# Patient Record
Sex: MALE | Race: WHITE | NOT HISPANIC OR LATINO | URBAN - METROPOLITAN AREA
[De-identification: names, ages, dates, MRNs, and addresses within clinical notes are randomized per-mention and may not be internally consistent; named-entity substitution may affect disease eponyms.]

---

## 2023-09-21 ENCOUNTER — TRANSCRIBE ORDERS (OUTPATIENT)
Dept: ADMINISTRATIVE | Facility: HOSPITAL | Age: 43
End: 2023-09-21

## 2023-09-21 DIAGNOSIS — M75.121 COMPLETE TEAR OF RIGHT ROTATOR CUFF, UNSPECIFIED WHETHER TRAUMATIC: Primary | ICD-10-CM

## 2023-10-12 ENCOUNTER — HOSPITAL ENCOUNTER (OUTPATIENT)
Dept: MRI IMAGING | Facility: HOSPITAL | Age: 43
Discharge: HOME | End: 2023-10-12
Admitting: FAMILY MEDICINE
Payer: COMMERCIAL

## 2023-10-12 DIAGNOSIS — M75.121 COMPLETE TEAR OF RIGHT ROTATOR CUFF, UNSPECIFIED WHETHER TRAUMATIC: ICD-10-CM

## 2023-10-12 PROCEDURE — 73221 MRI JOINT UPR EXTREM W/O DYE: CPT

## 2023-11-01 ENCOUNTER — TRANSCRIBE ORDERS (OUTPATIENT)
Dept: ADMINISTRATIVE | Facility: HOSPITAL | Age: 43
End: 2023-11-01
Payer: COMMERCIAL

## 2023-11-01 DIAGNOSIS — M75.121 COMPLETE TEAR OF RIGHT ROTATOR CUFF, UNSPECIFIED WHETHER TRAUMATIC: Primary | ICD-10-CM

## 2023-11-13 ENCOUNTER — HOSPITAL ENCOUNTER (OUTPATIENT)
Dept: MRI IMAGING | Facility: HOSPITAL | Age: 43
Discharge: HOME | End: 2023-11-13
Admitting: FAMILY MEDICINE
Payer: COMMERCIAL

## 2023-11-13 DIAGNOSIS — M75.121 COMPLETE TEAR OF RIGHT ROTATOR CUFF, UNSPECIFIED WHETHER TRAUMATIC: ICD-10-CM

## 2023-11-13 PROCEDURE — 73221 MRI JOINT UPR EXTREM W/O DYE: CPT

## 2023-11-15 ENCOUNTER — OFFICE VISIT (OUTPATIENT)
Dept: ORTHOPEDIC SURGERY | Facility: CLINIC | Age: 43
End: 2023-11-15
Payer: COMMERCIAL

## 2023-11-15 VITALS
BODY MASS INDEX: 27.3 KG/M2 | WEIGHT: 195 LBS | DIASTOLIC BLOOD PRESSURE: 85 MMHG | OXYGEN SATURATION: 95 % | SYSTOLIC BLOOD PRESSURE: 132 MMHG | HEIGHT: 71 IN | HEART RATE: 75 BPM

## 2023-11-15 DIAGNOSIS — M19.011 ARTHRITIS OF RIGHT ACROMIOCLAVICULAR JOINT: ICD-10-CM

## 2023-11-15 DIAGNOSIS — M75.81 ROTATOR CUFF TENDONITIS, RIGHT: ICD-10-CM

## 2023-11-15 DIAGNOSIS — M25.511 RIGHT SHOULDER PAIN, UNSPECIFIED CHRONICITY: Primary | ICD-10-CM

## 2023-11-15 RX ORDER — LIDOCAINE HYDROCHLORIDE 10 MG/ML
5 INJECTION, SOLUTION INFILTRATION; PERINEURAL
Status: COMPLETED | OUTPATIENT
Start: 2023-11-15 | End: 2023-11-15

## 2023-11-15 RX ORDER — TRIAMCINOLONE ACETONIDE 40 MG/ML
40 INJECTION, SUSPENSION INTRA-ARTICULAR; INTRAMUSCULAR
Status: COMPLETED | OUTPATIENT
Start: 2023-11-15 | End: 2023-11-15

## 2023-11-15 RX ORDER — DICLOFENAC SODIUM 75 MG/1
75 TABLET, DELAYED RELEASE ORAL 2 TIMES DAILY
Qty: 60 TABLET | Refills: 0 | Status: SHIPPED | OUTPATIENT
Start: 2023-11-15

## 2023-11-15 RX ADMIN — LIDOCAINE HYDROCHLORIDE 5 ML: 10 INJECTION, SOLUTION INFILTRATION; PERINEURAL at 09:26

## 2023-11-15 RX ADMIN — TRIAMCINOLONE ACETONIDE 40 MG: 40 INJECTION, SUSPENSION INTRA-ARTICULAR; INTRAMUSCULAR at 09:26

## 2023-11-15 NOTE — PROGRESS NOTES
"Chief Complaint  Initial Evaluation and Pain of the Right Shoulder     Subjective      Chandana Kerr presents to [facility] for initial evaluation of the right shoulder. He had a MRI of the right shoulder on 11/13/23 and is here to review.  He has pain in the shoulder since this summer.  He noted it was really bad for 3 weeks but has improved some.  PCP looked and him and noted a winged scapula. He had been swimming a lot over the summer for exercise.  He has decrease ROM of his shoulder and pain at end ROM.  He uses ice at times.     No Known Allergies     Social History     Socioeconomic History    Marital status:    Tobacco Use    Smoking status: Former     Types: Cigarettes    Smokeless tobacco: Never        I reviewed the patient's chief complaint, history of present illness, review of systems, past medical history, surgical history, family history, social history, medications, and allergy list.     Review of Systems     Constitutional: Denies fevers, chills, weight loss  Cardiovascular: Denies chest pain, shortness of breath  Skin: Denies rashes, acute skin changes  Neurologic: Denies headache, loss of consciousness        Vital Signs:   /85   Pulse 75   Ht 180.3 cm (71\")   Wt 88.5 kg (195 lb)   SpO2 95%   BMI 27.20 kg/m²          Physical Exam  General: Alert. No acute distress    Ortho Exam        RIGHT SHOULDER Forward flexion 170. Abduction 170. External rotation 60. Internal rotation T3. Positive Cross body adduction. Supraspinatus strength 5/5. Infraspinatus Strength 5/5. Infrared subscap 5/5. Positive Mercedes. Positive Neer. Negative Apprehension. Negative Lift off. (Negative Obriens. Sensation intact to light touch, median, radial, ulnar nerve. Positive AIN, PIN, ulnar nerve motor. Positive pulses. Positive Impingement signs. Good strength in triceps, biceps, deltoid, wrist extensors and wrist flexors. No muscle atrophy or motor neurology deficits.  "       Large Joint RIGHT SHOULDER  Date/Time: 11/15/2023 9:26 AM  Consent given by: patient  Site marked: site marked  Timeout: Immediately prior to procedure a time out was called to verify the correct patient, procedure, equipment, support staff and site/side marked as required   Supporting Documentation  Indications: pain   Procedure Details  Location: shoulder -   Preparation: Patient was prepped and draped in the usual sterile fashion  Needle size: 22 G  Medications administered: 5 mL lidocaine 1 %; 40 mg triamcinolone acetonide 40 MG/ML  Patient tolerance: patient tolerated the procedure well with no immediate complications          Imaging Results (Most Recent)       None             Result Review :       MRI Shoulder Right Without Contrast    Result Date: 11/13/2023  Narrative: PROCEDURE: MRI SHOULDER RIGHT WO CONTRAST  COMPARISON: [facility], MR, MRI SHOULDER RIGHT WO CONTRAST, 10/12/2023, 16:09.  INDICATIONS: Complete tear of right rotator cuff      TECHNIQUE: A variety of imaging planes and parameters were utilized for visualization of suspected pathology.  Images were performed without contrast.   FINDINGS:  No fracture or malalignment is identified.  There is T2 high signal in the distal clavicle consistent with edema, worse in the interval.   No AC joint effusion is noted.  A type 2 acromion is present.  There is a tiny bursal surface tear of the supraspinatus tendon extending approximately 66% of tendon thickness but measuring only 2 mm transverse.  The rotator cuff otherwise appears unremarkable.  No muscle body atrophy or signal alteration is noted surrounding the shoulder girdle.  The biceps long head tendon and its attachment to the superior labrum are intact.  The inferior labrum appears somewhat diminutive and irregular.  There is thickening of the posterior band of the inferior glenohumeral ligament.  There is a tear of the posterosuperior and mid posterior labrum.  No  glenohumeral joint effusion or loose body is seen.  Cartilage in the joint is intact.      Impression:   1. Tear of the posterosuperior and mid posterior labrum 2. Thickening of the posterior band of the inferior glenohumeral ligament suspected to represent scarring.  Superimposed calcification such as related to a Ramos lesion would also be in the differential. 3. Edema in the distal clavicle.  Finding may be secondary to osteolysis such as related to chronic repetitive type injury. 4. Tiny bursal surface tear of the supraspinatus tendon, as above      Rubén Armenta M.D.       Electronically Signed and Approved By: Rubén Armenta M.D. on 11/13/2023 at 13:18                     Assessment and Plan     Diagnoses and all orders for this visit:    1. Right shoulder pain, unspecified chronicity (Primary)    2. Arthritis of right acromioclavicular joint    3. Rotator cuff tendonitis, right        Discussed the treatment plan with the patient. I reviewed the MRI results with the patient.     Discussed the risks and benefits of conservative measures. The patient expressed understanding and wished to proceed with a right shoulder steroid injection. He tolerated the injection well.    Prescribed physical therapy.  Start 1-2 weeks after therapy if still painful.     Prescribed anti inflammatory.      Call or return if worsening symptoms.    Follow Up     4-6 weeks to assess how injection worked or how physical therapy helped.     Patient was given instructions and counseling regarding his condition or for health maintenance advice. Please see specific information pulled into the AVS if appropriate.     Scribed for Vince Garcia MD by Velia Huang MA.  11/15/23   08:23 EST    I have personally performed the services described in this document as scribed by the above individual and it is both accurate and complete. Vince Garcia MD 11/15/23

## 2023-11-16 ENCOUNTER — PATIENT ROUNDING (BHMG ONLY) (OUTPATIENT)
Dept: ORTHOPEDIC SURGERY | Facility: CLINIC | Age: 43
End: 2023-11-16
Payer: COMMERCIAL

## 2023-11-16 NOTE — PROGRESS NOTES
A [employer] message has been sent to the patient for PATIENT ROUNDING with [facility].

## 2024-03-12 ENCOUNTER — TELEPHONE (OUTPATIENT)
Dept: ORTHOPEDIC SURGERY | Facility: CLINIC | Age: 44
End: 2024-03-12
Payer: COMMERCIAL

## 2024-03-12 NOTE — TELEPHONE ENCOUNTER
PATIENT WAS ORDERED OUTPATIENT PT ON 11/15/2023 FOR HIS RIGHT SHOULDER, WAS SCHEDULED TO FOLLOW-UP ON 1/10/2024 TO DISCUSS PROGRESS W/ INJECTION & PT  (PATIENT CANCELED APPT).      PER PTA IN [place], PATIENT NO SHOWED FOR APPOINTMENT, SEVERAL CALLS TO SCHEDULE PT WERE MADE TO PATIENT WITH NO RETURN CALLS.     PT REFERRAL FROM 11/15/2023 CLOSED.